# Patient Record
Sex: MALE | Race: OTHER | HISPANIC OR LATINO | URBAN - METROPOLITAN AREA
[De-identification: names, ages, dates, MRNs, and addresses within clinical notes are randomized per-mention and may not be internally consistent; named-entity substitution may affect disease eponyms.]

---

## 2022-01-08 ENCOUNTER — EMERGENCY (EMERGENCY)
Facility: HOSPITAL | Age: 22
LOS: 1 days | Discharge: HOME | End: 2022-01-08
Admitting: EMERGENCY MEDICINE
Payer: MEDICAID

## 2022-01-08 VITALS
TEMPERATURE: 98 F | OXYGEN SATURATION: 97 % | SYSTOLIC BLOOD PRESSURE: 111 MMHG | HEART RATE: 58 BPM | RESPIRATION RATE: 18 BRPM | WEIGHT: 149.91 LBS | DIASTOLIC BLOOD PRESSURE: 69 MMHG

## 2022-01-08 DIAGNOSIS — K08.89 OTHER SPECIFIED DISORDERS OF TEETH AND SUPPORTING STRUCTURES: ICD-10-CM

## 2022-01-08 PROCEDURE — 99284 EMERGENCY DEPT VISIT MOD MDM: CPT

## 2022-01-08 RX ORDER — KETOROLAC TROMETHAMINE 30 MG/ML
30 SYRINGE (ML) INJECTION ONCE
Refills: 0 | Status: DISCONTINUED | OUTPATIENT
Start: 2022-01-08 | End: 2022-01-08

## 2022-01-08 RX ADMIN — Medication 30 MILLIGRAM(S): at 12:10

## 2022-01-08 NOTE — ED ADULT TRIAGE NOTE - CHIEF COMPLAINT QUOTE
Has complaints of pain to his left side lower jaw. "I have pain in my wisdom tooth."  He is unable to do daily functioning due to the pain.

## 2022-01-08 NOTE — ED PROVIDER NOTE - NSFOLLOWUPINSTRUCTIONS_ED_ALL_ED_FT
Take Ibuprofen 600mg every 6-8 hours as needed for pain, take with food, and in addition you may take Tylenol 500 mg every 6-8 hours as needed for pain    Please go to [facility] walk in clinic  [facility] is located at [facility] [address] [place] [address] ([facility]) in [place].  MONDAY THROUGH THURSDAY:  8:30 am - 8:00 pm (on a first-come, first-served basis)  FRIDAY AND SATURDAY  8:30 am - 4:00 pm (on a first-come, first-served basis)    Return for any concerning or worsening symptoms.

## 2022-01-08 NOTE — ED ADULT NURSE NOTE - OBJECTIVE STATEMENT
Pt came in c/o of LL "wisdom tooth pain". Pt sitting comfortably in chair. NAD. Denies fever, chills, sob, cp, n/v/d. A&Ox3 speaking in full sentences. NAD

## 2022-01-08 NOTE — ED PROVIDER NOTE - OBJECTIVE STATEMENT
22 yo male c/o left lower molar toothache today, denies trauma or fever. has been taking nsaids for pain. does not have dental followup.

## 2022-01-08 NOTE — ED PROVIDER NOTE - PATIENT PORTAL LINK FT
You can access the FollowMyHealth Patient Portal offered by [facility] by registering at the following website: [email or web address]. By joining [employer]’s FollowMyHealth portal, you will also be able to view your health information using other applications (apps) compatible with our system.

## 2022-01-08 NOTE — ED PROVIDER NOTE - CLINICAL SUMMARY MEDICAL DECISION MAKING FREE TEXT BOX
toothache with deep cavity on exam, no signs of abscess, airway patent, no drooling, tolerating po. will give toradol IM, advised f/u dentist, will give [facility] dental clinic info.

## 2022-01-08 NOTE — ED PROVIDER NOTE - PHYSICAL EXAMINATION
CONSTITUTIONAL: Well-appearing; well-nourished; in no apparent distress.   	HEAD: Normocephalic; atraumatic.   	EYES: clear  	ENT: deep caries to left lower molar tooth. no surrounding gum swelling or signs of abscess. no pharyngeal edema. airway patent.   	NECK: Supple; non-tender; no crepitus  	NEURO: A & O x 3; face symmetric; grossly unremarkable.   PSYCHOLOGICAL: The patient’s mood and manner are appropriate.

## 2023-11-14 ENCOUNTER — EMERGENCY (EMERGENCY)
Facility: HOSPITAL | Age: 23
LOS: 1 days | Discharge: HOME | End: 2023-11-14
Attending: EMERGENCY MEDICINE | Admitting: EMERGENCY MEDICINE
Payer: SELF-PAY

## 2023-11-14 VITALS
OXYGEN SATURATION: 96 % | RESPIRATION RATE: 18 BRPM | TEMPERATURE: 97 F | HEART RATE: 101 BPM | DIASTOLIC BLOOD PRESSURE: 86 MMHG | SYSTOLIC BLOOD PRESSURE: 133 MMHG

## 2023-11-14 PROCEDURE — 99053 MED SERV 10PM-8AM 24 HR FAC: CPT

## 2023-11-14 PROCEDURE — 99284 EMERGENCY DEPT VISIT MOD MDM: CPT

## 2023-11-15 LAB
FLUAV AG NPH QL: SIGNIFICANT CHANGE UP
FLUAV AG NPH QL: SIGNIFICANT CHANGE UP
FLUBV AG NPH QL: SIGNIFICANT CHANGE UP
FLUBV AG NPH QL: SIGNIFICANT CHANGE UP
RSV RNA NPH QL NAA+NON-PROBE: SIGNIFICANT CHANGE UP
RSV RNA NPH QL NAA+NON-PROBE: SIGNIFICANT CHANGE UP
S PYO AG SPEC QL IA: NEGATIVE — SIGNIFICANT CHANGE UP
S PYO AG SPEC QL IA: NEGATIVE — SIGNIFICANT CHANGE UP
SARS-COV-2 RNA SPEC QL NAA+PROBE: SIGNIFICANT CHANGE UP
SARS-COV-2 RNA SPEC QL NAA+PROBE: SIGNIFICANT CHANGE UP

## 2023-11-15 PROCEDURE — 71046 X-RAY EXAM CHEST 2 VIEWS: CPT | Mod: 26

## 2023-11-15 RX ORDER — AZITHROMYCIN 500 MG/1
1 TABLET, FILM COATED ORAL
Qty: 5 | Refills: 0
Start: 2023-11-15 | End: 2023-11-19

## 2023-11-15 RX ORDER — FLUTICASONE PROPIONATE 50 MCG
100 SPRAY, SUSPENSION NASAL
Qty: 1 | Refills: 0
Start: 2023-11-15

## 2023-11-15 RX ORDER — LORATADINE, PSEUDOEPHEDRINE SULFATE 5; 120 MG/1; MG/1
1 TABLET, FILM COATED, EXTENDED RELEASE ORAL
Qty: 14 | Refills: 0
Start: 2023-11-15 | End: 2023-11-28

## 2023-11-15 RX ORDER — FLUTICASONE PROPIONATE 50 MCG
1 SPRAY, SUSPENSION NASAL ONCE
Refills: 0 | Status: COMPLETED | OUTPATIENT
Start: 2023-11-15 | End: 2023-11-15

## 2023-11-15 RX ORDER — DEXAMETHASONE 0.5 MG/5ML
10 ELIXIR ORAL ONCE
Refills: 0 | Status: COMPLETED | OUTPATIENT
Start: 2023-11-15 | End: 2023-11-15

## 2023-11-15 RX ORDER — LORATADINE 10 MG/1
10 TABLET ORAL ONCE
Refills: 0 | Status: COMPLETED | OUTPATIENT
Start: 2023-11-15 | End: 2023-11-15

## 2023-11-15 RX ADMIN — LORATADINE 10 MILLIGRAM(S): 10 TABLET ORAL at 00:42

## 2023-11-15 RX ADMIN — Medication 10 MILLIGRAM(S): at 00:42

## 2023-11-15 RX ADMIN — Medication 1 SPRAY(S): at 00:42

## 2023-11-15 NOTE — ED PROVIDER NOTE - NSFOLLOWUPINSTRUCTIONS_ED_ALL_ED_FT
Your testing today was unremarkable for flu/COVID/RSV or signs of pneumonia on your x-ray.  I suspect that this is postnasal drip from allergies.  Take Flonase as directed: 2 sprays into each nostril twice a day.  Take Claritin as directed.  For at least 1 week and if not getting any better, fill prescription for antibiotics and take as directed.  Please reach out to your primary care physician and notify them of your visit to the emergency department this evening as they may wish to see you prior to then previously scheduled.

## 2023-11-15 NOTE — ED PROVIDER NOTE - CLINICAL SUMMARY MEDICAL DECISION MAKING FREE TEXT BOX
23M history of seasonal allergies (not taking medications)  1m of cough, throat inflammation/mucus production, nasal congestion. Denies fevers/chills, nausea/vomiting, headache, chest pain, abdominal pain, bowel/bladder habit changes, known sick contacts. Has not seen his PMD or another MD for this.     PE: A&Ox3, well appearing, NAD, NCAT, clear oropharynx without exudates/swelling/erythema, regular respiratory effort CTAB, RRR, moving all four extremities equally.     MDM: Patient with 1 month of upper respiratory symptoms concerning for possible URI, bronchitis, or seasonal allergies with post-nasal drip. Will eval with labs and treat symptomatically.

## 2023-11-15 NOTE — ED ADULT NURSE NOTE - NSFALLUNIVINTERV_ED_ALL_ED
Bed/Stretcher in lowest position, wheels locked, appropriate side rails in place/Call bell, personal items and telephone in reach/Instruct patient to call for assistance before getting out of bed/chair/stretcher/Non-slip footwear applied when patient is off stretcher/[place] to call system/Physically safe environment - no spills, clutter or unnecessary equipment/Purposeful proactive rounding/Room/bathroom lighting operational, light cord in reach

## 2023-11-15 NOTE — ED ADULT NURSE NOTE - OBJECTIVE STATEMENT
Pt is a 23y male c/o cough. Pt reports cough [id number] progressing to chest/nasal congestion and yellow mucus today. Pt reports difficulty breathing upon awakening. Denies ITC med use, states he took a abx (name unk) that his aunt gave him today. Denies fever, chills, CP. Denies PMH.

## 2023-11-15 NOTE — ED PROVIDER NOTE - PHYSICAL EXAMINATION
PE: A&Ox3, well appearing, NAD, NCAT, clear oropharynx without exudates/swelling/erythema, regular respiratory effort CTAB, RRR, moving all four extremities equally.

## 2023-11-15 NOTE — ED PROVIDER NOTE - WR ORDER STATUS 1
on the discharge service for the patient. I have reviewed and made amendments to the documentation where necessary.
Performed

## 2023-11-15 NOTE — ED PROVIDER NOTE - PATIENT PORTAL LINK FT
You can access the FollowMyHealth Patient Portal offered by [facility] by registering at the following website: [email or web address]. By joining [employer]’s FollowMyHealth portal, you will also be able to view your health information using other applications (apps) compatible with our system.

## 2023-11-15 NOTE — ED PROVIDER NOTE - OBJECTIVE STATEMENT
23M history of seasonal allergies (not taking medications)  1m of cough, throat inflammation/mucus production, nasal congestion. Denies fevers/chills, nausea/vomiting, headache, chest pain, abdominal pain, bowel/bladder habit changes, known sick contacts. Has not seen his PMD or another MD for this.

## 2023-11-16 DIAGNOSIS — R05.3 CHRONIC COUGH: ICD-10-CM

## 2023-11-16 DIAGNOSIS — R09.81 NASAL CONGESTION: ICD-10-CM

## 2023-11-16 DIAGNOSIS — Z20.822 CONTACT WITH AND (SUSPECTED) EXPOSURE TO COVID-19: ICD-10-CM

## 2023-11-16 LAB
CULTURE RESULTS: SIGNIFICANT CHANGE UP
CULTURE RESULTS: SIGNIFICANT CHANGE UP
SPECIMEN SOURCE: SIGNIFICANT CHANGE UP
SPECIMEN SOURCE: SIGNIFICANT CHANGE UP